# Patient Record
Sex: FEMALE | Race: WHITE | NOT HISPANIC OR LATINO | Employment: PART TIME | URBAN - METROPOLITAN AREA
[De-identification: names, ages, dates, MRNs, and addresses within clinical notes are randomized per-mention and may not be internally consistent; named-entity substitution may affect disease eponyms.]

---

## 2022-07-26 ENCOUNTER — TELEPHONE (OUTPATIENT)
Dept: OTHER | Facility: CLINIC | Age: 42
End: 2022-07-26

## 2022-07-26 NOTE — TELEPHONE ENCOUNTER
Pt needs to be scheduled for new pt in clinic consult with vascular medicine (can be with Umm Enciso PA-C).  Will route to scheduling to coordinate an appointment at next available.   Chart review: Hx of Sjogren's, arthralgias.      Please tell pt to bring progress notes from Dr. Soares along with her or have them faxed to us.     Appt note: new pt self referred for  pronounced pain and numbing in lower extremities. Also has other concerns with pain and tingling in other areas.      Claudia Aguilera, BELLN, RN  [facility] [place]

## 2022-07-26 NOTE — TELEPHONE ENCOUNTER
[facility]    Who is the provider?:  Self referral      Preferred provider location: [place]    Person calling: Sabrina Soler  Call back phone: [phone]       Nurse call back needed: NO          Can we leave a message?  YES        Reason for call:    Patient has pronounced pain and numbing in lower extremities. Also has other concerns with pain and tingling in other areas. Rheumatologist Dr. Soares at [facility] recommended the [facility]    Outside imaging: n/a    Name / Loc of pharmacy: n/a

## 2022-08-01 NOTE — TELEPHONE ENCOUNTER
Left voicemail with instructions for patient to call back to schedule their appointment(s)    August 1, 2022 , 8:28 AM

## 2022-08-03 NOTE — TELEPHONE ENCOUNTER
Future Appointments   Date Time Provider Department Center   8/8/2022  2:50 PM Umm Enciso PA-C [facility]

## 2022-08-08 ENCOUNTER — OFFICE VISIT (OUTPATIENT)
Dept: OTHER | Facility: CLINIC | Age: 42
End: 2022-08-08
Attending: FAMILY MEDICINE
Payer: COMMERCIAL

## 2022-08-08 VITALS
OXYGEN SATURATION: 99 % | DIASTOLIC BLOOD PRESSURE: 63 MMHG | WEIGHT: 143 LBS | BODY MASS INDEX: 21.18 KG/M2 | HEART RATE: 63 BPM | SYSTOLIC BLOOD PRESSURE: 102 MMHG | HEIGHT: 69 IN

## 2022-08-08 DIAGNOSIS — I83.813 VARICOSE VEINS OF BOTH LOWER EXTREMITIES WITH PAIN: Primary | ICD-10-CM

## 2022-08-08 PROCEDURE — 99204 OFFICE O/P NEW MOD 45 MIN: CPT | Performed by: PHYSICIAN ASSISTANT

## 2022-08-08 PROCEDURE — G0463 HOSPITAL OUTPT CLINIC VISIT: HCPCS

## 2022-08-08 RX ORDER — MULTIVITAMIN
1 CAPSULE ORAL
COMMUNITY

## 2022-08-08 RX ORDER — PANTOPRAZOLE SODIUM 40 MG/1
TABLET, DELAYED RELEASE ORAL
COMMUNITY
Start: 2022-05-16

## 2022-08-08 RX ORDER — ASCORBIC ACID 100 MG
TABLET,CHEWABLE ORAL
COMMUNITY

## 2022-08-08 RX ORDER — PREDNISONE 5 MG/1
5 TABLET ORAL
COMMUNITY
Start: 2022-03-28

## 2022-08-08 RX ORDER — CITALOPRAM HYDROBROMIDE 40 MG/1
40 TABLET ORAL
COMMUNITY
Start: 2021-09-18

## 2022-08-08 NOTE — PROGRESS NOTES
[facility] INITIAL VASCULAR MEDICINE CONSULT      PRIMARY HEALTH CARE PROVIDER:  Fernando Duran      REFERRING HEALTH CARE PROVIDER;  Fernando Duran      REASON FOR CONSULT:  Left leg pain, venous insufficicency      HPI: Sabrina Soler is a very pleasant 41 year old female with an extensive past medical history including primary Sjogren's disease which was diagnosed in her 20's. She has been followed by Rheumatology closely over the years and has undergone numerous treatment regimens. She has a long history of varicose veins and venous insufficiency with superficial thrombophlebitis in the left thigh varicose vein in 12/2019. She was seen by Vascular in the past (no records found currently in our system and care everywhere) and apparently they had mentioned surgical intervention. She never pursued this further.     For the past 4-5 months she has had increased left lower extremity discomfort/pain. She has a small amount in the right as well. She has known Raynaud's, which she feels is getting worse, and diffuse polyarthralgias and myalgias. Rheumatology wanted her to be evaluated by Vascular to further look into venous insufficiency as a contributing factor to her lower extremity discomfort as they try to rule in/out cryoglobulin anemic vasculitis secondary to Sjogren's.       PAST MEDICAL HISTORY  Sjogren Syndrome and complications from this disease including SICCA, chronic fatigue and myalgias    CURRENT MEDICATIONS  Ascorbic Acid (VITAMIN C) 100 MG CHEW,   cholecalciferol 50 MCG (2000 UT) CAPS,   citalopram (CELEXA) 40 MG tablet, Take 40 mg by mouth  multivitamin (DEKAS ESSENTIAL) capsule, Take 1 capsule by mouth  Omega-3 Fatty Acids (FISH OIL) 645 MG CAPS, Take 1 capsule by mouth  pantoprazole (PROTONIX) 40 MG EC tablet, TAKE 1 TABLET BY MOUTH EVERY DAY. DO NOT CRUSH. TAKE 30 MINUTES BEFORE BREAKFAST  predniSONE (DELTASONE) 5 MG tablet, Take 5 mg by mouth  Probiotic Product  (PROBIOTIC-10 PO),   CITALOPRAM HYDROBROMIDE 20 MG OR TABS, 1 TABLET DAILY  FOLIC ACID OR, 1 tab daily  HEALON IO, eye drops as needed  METHOTREXATE OR, 3 tabs once per week    No current facility-administered medications on file prior to visit.      PAST SURGICAL HISTORY:  Tonsillectomy and adenoidectomy   Dental implants    ALLERGIES   No Known Allergies    FAMILY HISTORY  PGM has diabetes and she has a brother who had Burkitt's lymphoma.      SOCIAL HISTORY  Social History     Socioeconomic History     Marital status:      Spouse name: Not on file     Number of children: 2     Years of education: Not on file     Highest education level: Not on file   Occupational History     Not on file   Tobacco Use     Smoking status: Never Smoker     Smokeless tobacco: Never Used   Substance and Sexual Activity     Alcohol use: Yes     Comment: occ     Drug use: Never       ROS:   General:   + fatigue.  No fever or chills  Eyes: Negative for vision changes. Eye issues due to SICCA from sjogrens  ENT: No problems with ears, nose or throat.  No difficulty swallowing. Dry mouth with dental implants from sjogren's  Resp: No coughing, wheezing or shortness of breath  CV: No chest pains or palpitations  GI: No nausea, vomiting,  heartburn, diarrhea, constipation or change in bowel habits. + GI issues being followed by GI  : No urinary frequency or dysuria, bladder or kidney problems  Musculoskeletal: Myalgias intermittent from sjogrens, left leg pain.  Neurologic: No headaches, numbness, tingling, weakness, problems with balance or coordination  Psychiatric: No problems with anxiety, depression or mental health  Heme/immune/allergy: No history of bleeding or clotting problems or anemia.  No allergies. + Sjogren's  Endocrine: No history of thyroid disease, diabetes or other endocrine disorders  Skin: No rashes,worrisome lesions or skin problems  Vascular:  No claudication, lifestyle limiting or otherwise; no ischemic rest  "pain; no non-healing ulcers. No weakness, No loss of sensation. Varicose veins as above.     EXAM:  /63 (BP Location: Left arm, Patient Position: Chair, Cuff Size: Adult Regular)   Pulse 63   Ht 5' 8.5\" (1.74 m)   Wt 143 lb (64.9 kg)   SpO2 99%   BMI 21.43 kg/m    In general, the patient is a pleasant female in no apparent distress.    HEENT: NC/AT.  PERRLA.  EOMI.  Sclerae white, not injected.    Heart: RRR. Normal S1, S2 splits physiologically. No murmur, rub, click, or gallop.   Lungs: CTA.  No ronchi, wheezes, rales.    Abdomen: Soft, nontender, nondistended.   Extremities: No clubbing, cyanosis, or edema. She has bulging varicose veins in both legs.  No wounds.       Labs:  LIVER ENZYME RESULTS:  Lab Results   Component Value Date    AST 38 10/22/2008    ALT 20 10/22/2008       CBC RESULTS:  Lab Results   Component Value Date    WBC 5.6 10/22/2008    RBC 3.98 10/22/2008    HGB 11.1 (L) 10/22/2008    HCT 33.8 (L) 10/22/2008    MCV 85 10/22/2008    MCH 27.9 10/22/2008    MCHC 32.8 10/22/2008    RDW 13.3 10/22/2008     10/22/2008       BMP RESULTS:  Lab Results   Component Value Date    CR 0.89 07/09/2008    GFRESTIMATED 76 07/09/2008    GFRESTBLACK >90 07/09/2008          Procedures:   None    Assessment and Plan:   Symptomatic varicose veins left > right lower extremity    -She has had varicose veins in her legs for many years. These could be contributing to her leg pain, heaviness and fatigue.   -It is also possible that her underlying autoimmune disease is also contributing to her leg pain and fatigue.     Recommendations:   -Will send her for venous competency testing to evaluate the severity of her venous insufficiency/varicose veins.   -Follow-up with us after this is completed.   -She should continue to wear compression socks, 20-30 mmHg thigh high in the mean time (she already has these).    Raynaud's in the setting of Sjogren's Syndrome    -Continue thermal protection  -Follows with " Rheumatology    Umm Enciso PA-C      45 minutes spent on the date of the encounter doing chart review, history and exam, documentation, and further activities as noted above.

## 2022-08-08 NOTE — PATIENT INSTRUCTIONS
Wear compression socks during the day.     2.  Get venous competency studies done. We can do a phone follow-up to review results.     3. Call us with any questions or concerns ([phone]).

## 2022-08-08 NOTE — PROGRESS NOTES
"Patient is here to discuss consult.    /63 (BP Location: Left arm, Patient Position: Chair, Cuff Size: Adult Regular)   Pulse 63   Ht 5' 8.5\" (1.74 m)   Wt 143 lb (64.9 kg)   SpO2 99%   BMI 21.43 kg/m      Questions patient would like addressed today are: Left leg is worse for patient especially along the calf. Patient also had an US which showed something other than a clot.    Refills are needed: N/A    Has homecare services and agency name:  [facility]  "

## 2022-08-11 ENCOUNTER — ANCILLARY PROCEDURE (OUTPATIENT)
Dept: ULTRASOUND IMAGING | Facility: CLINIC | Age: 42
End: 2022-08-11
Attending: PHYSICIAN ASSISTANT
Payer: COMMERCIAL

## 2022-08-11 DIAGNOSIS — I83.813 VARICOSE VEINS OF BOTH LOWER EXTREMITIES WITH PAIN: ICD-10-CM

## 2022-08-11 PROCEDURE — 93970 EXTREMITY STUDY: CPT | Performed by: SURGERY

## 2022-08-18 ENCOUNTER — VIRTUAL VISIT (OUTPATIENT)
Dept: OTHER | Facility: CLINIC | Age: 42
End: 2022-08-18
Attending: PHYSICIAN ASSISTANT
Payer: COMMERCIAL

## 2022-08-18 DIAGNOSIS — I87.2 VENOUS INCOMPETENCE: Primary | ICD-10-CM

## 2022-08-18 PROCEDURE — 99213 OFFICE O/P EST LOW 20 MIN: CPT | Mod: 95 | Performed by: PHYSICIAN ASSISTANT

## 2022-08-18 NOTE — PROGRESS NOTES
[facility] CENTER  VASCULAR MEDICINE FOLLOW-UP VISIT      PRIMARY HEALTH CARE PROVIDER:  Fernando Duran    REASON FOR VISIT:  Follow-up venous competency testing      HPI: Sabrina Soler is a very pleasant 41 year old female with an extensive past medical history including primary Sjogren's disease which was diagnosed in her 20's. She has been followed by Rheumatology closely over the years and has undergone numerous treatment regimens. She has a long history of varicose veins and venous insufficiency with superficial thrombophlebitis in the left thigh varicose vein in 12/2019. She was seen by Vascular in the past (no records found currently in our system and care everywhere) and apparently they had mentioned surgical intervention. She never pursued this further. She has been wearing compression socks (thigh high) for years.     For the past 4-5 months she has had increased left lower extremity discomfort/pain. She has a small amount in the right as well. She has known Raynaud's, which she feels is getting worse, and diffuse polyarthralgias and myalgias. Rheumatology wanted her to be evaluated by Vascular to further look into venous insufficiency as a contributing factor to her lower extremity discomfort as they try to rule in/out cryoglobulin anemic vasculitis secondary to Sjogren's.      We ordered venous competency studies to further evaluate the severity of her venous incompetence and varicose veins. This was undertaken on 8/11/22 and she presents now to review the results.       PAST MEDICAL HISTORY  Sjogren Syndrome and complications from this disease including SICCA, chronic fatigue and myalgias    PAST SURGICAL HISTORY:  Tonsillectomy and adenoidectomy   Dental implants    CURRENT MEDICATIONS  Ascorbic Acid (VITAMIN C) 100 MG CHEW,   cholecalciferol 50 MCG (2000 UT) CAPS,   citalopram (CELEXA) 40 MG tablet, Take 40 mg by mouth  CITALOPRAM HYDROBROMIDE 20 MG OR TABS, 1 TABLET DAILY  FOLIC  ACID OR, 1 tab daily  HEALON IO, eye drops as needed  METHOTREXATE OR, 3 tabs once per week  multivitamin (DEKAS ESSENTIAL) capsule, Take 1 capsule by mouth  Omega-3 Fatty Acids (FISH OIL) 645 MG CAPS, Take 1 capsule by mouth  pantoprazole (PROTONIX) 40 MG EC tablet, TAKE 1 TABLET BY MOUTH EVERY DAY. DO NOT CRUSH. TAKE 30 MINUTES BEFORE BREAKFAST  predniSONE (DELTASONE) 5 MG tablet, Take 5 mg by mouth  Probiotic Product (PROBIOTIC-10 PO),     No current facility-administered medications on file prior to visit.      ALLERGIES   No Known Allergies    FAMILY HISTORY  PGM has diabetes and she has a brother who had Burkitt's lymphoma.    SOCIAL HISTORY  Social History     Socioeconomic History     Marital status:      Spouse name: Not on file     Number of children: 2     Years of education: Not on file     Highest education level: Not on file   Occupational History     Not on file   Tobacco Use     Smoking status: Never Smoker     Smokeless tobacco: Never Used   Substance and Sexual Activity     Alcohol use: Yes     Comment: occ     Drug use: Never       ROS:   Complete ROS negative other than what is stated in HPI.     EXAM:  There were no vitals taken for this visit.  In general, the patient is a pleasant female in no apparent distress.    Unable to do a full exam as this was a telephone visit.     Labs:  None    Procedures:   BILATERAL LOWER EXTREMITY VENOUS DUPLEX FOR VENOUS INSUFFICIENCY  TECHNICAL SUMMARY 8/11/22     A duplex ultrasound study using color flow was performed, to evaluate the bilateral lower extremity veins for valvular incompetence with the patient in a steep reversed trendelenberg.      RIGHT:     The deep veins demonstrate phasic flow, compress and respond to augmentations.  There is no DVT.  The common femoral and mid femoral veins are incompetent and free of thrombus. The remaining deep veins are competent and free of thrombus.      The GSV demonstrates phasic flow, compresses and  responds to augmentations from the saphenofemoral junction to the ankle with no evidence of thrombus. The great saphenous vein measures 10.1 mm at the saphenofemoral junction, 7.2 mm at the proximal thigh, and 5.4 mm at the knee. The GSV is incompetent from SFJ to Mid Calf, with the greatest reflux time of 4768 milliseconds.  The GSV gives rise to a varicose branch measuring 6.4 mm off the  Distal Thigh that courses Posteromedial with a reflux time of 4207 milliseconds.      The AASV is competent ( 2.4 mm) draining into the saphenofemoral junction.       The Giacomini vein is competent ( 1.6 mm) communicating with the small saphenous vein at the knee level.      The SSV demonstrates phasic flow, compresses and responds to augmentations from the popliteal space to the ankle.  No thrombus is seen. The saphenopopliteal junction is absent. The SSV is incompetent at the Mid Calf with a reflux time of 7720 milliseconds.  The SSV gives rise to a varicose branch measuring 3.2 mm off the Mid Calf that courses Toward the ankle with a reflux time of 4949 milliseconds.  Proximal mid SSV also connects with GSV tributary varicose vein.     Perforators: there is no evidence of incompetent  veins at any level.      LEFT:     The deep veins demonstrate phasic flow, compress and respond to augmentations.  There is no DVT.  The common femoral and proximal femoral veins is incompetent and free of thrombus. The remaining deep veins are competent and free of thrombus.      The GSV demonstrates phasic flow, compresses and responds to augmentations from the saphenofemoral junction to the ankle with no evidence of thrombus. The great saphenous vein measures 8.5 mm at the saphenofemoral junction, 6.3 mm at the proximal thigh, and 5.5 mm at the knee. The GSV is incompetent from SFJ to Proximal Calf, with the greatest reflux time of 7753 milliseconds.  The GSV gives rise to a varicose branch measuring 8.7 mm off the  Distal  Thigh that courses Medial with a reflux time of 2771 milliseconds.       The AASV is competent ( 3.3 mm) draining into the saphenofemoral junction.       The Giacomini vein is competent ( 1.3 mm) communicating with the small saphenous vein at the knee level.      The SSV demonstrates phasic flow, compresses and responds to augmentations from the popliteal space to the ankle.  No reflux or thrombus is seen. The saphenopopliteal junction is absent.      Perforators: there is no evidence of incompetent  veins at any level.         FINAL SUMMARY:  1.  No evidence of deep vein thrombosis or superficial thrombophlebitis in either lower extremity  2.  Right common femoral and mid femoral vein incompetence  3.  Left common femoral and proximal femoral vein incompetence.  4.  Right great saphenous vein incompetence  5.  Limited right small saphenous vein incompetence  6.  Incompetent vein branch coursing from thigh segment great saphenous vein  7.  Left great saphenous vein incompetence  8.  Incompetent vein branch coursing from left thigh great saphenous vein     The time of incompetence is greater than 500 milliseconds in superficial and  veins and greater than 1000 milliseconds in deep veins.       Assessment and Plan:   Symptomatic varicose veins left > right lower extremity     -She has had varicose veins in her legs for many years. These could be contributing to her leg pain, heaviness and fatigue.   -It is also possible that her underlying autoimmune disease is also contributing to her leg pain and fatigue.   -Venous competency with both deep and superficial venous incompetence.   -She has worn compression socks through the years without improvement in her symptoms.     Recommendations:   -Will send her to Dr. Corrales at [facility] to weigh in on how much her venous incompetence is contributing to her leg symptoms and evaluate if anything can be done for her venous incompetence/varicose  veins.   -She should continue to wear compression socks, 20-30 mmHg thigh high.       Raynaud's in the setting of Sjogren's Syndrome     -Continue thermal protection  -Follows with Rheumatology        Umm Enciso PA-C    Telephone Visit Details    Start Time: 10:20am  End Time: 10:28am    Originating Location (patient location): [place]    Distant Location (provider location): [facility]    This visit is being conducted as a virtual visit due to the emphasis on mitigation of the COVID-19 virus pandemic. The clinician has decided that the risk of an in-office visit outweighs the benefit for this patient.             20 minutes spent on the date of the encounter doing chart review, history and exam, documentation, and further activities as noted above.

## 2022-08-18 NOTE — PROGRESS NOTES
Sabrina is a 41 year old who is being evaluated via a billable telephone visit.      What phone number would you like to be contacted at? [phone]  How would you like to obtain your AVS? Shawna Chen

## 2022-09-08 ENCOUNTER — OFFICE VISIT (OUTPATIENT)
Dept: VASCULAR SURGERY | Facility: CLINIC | Age: 42
End: 2022-09-08
Attending: PHYSICIAN ASSISTANT
Payer: COMMERCIAL

## 2022-09-08 DIAGNOSIS — I87.2 VENOUS INCOMPETENCE: ICD-10-CM

## 2022-09-08 DIAGNOSIS — I83.893 VARICOSE VEINS OF BILATERAL LOWER EXTREMITIES WITH OTHER COMPLICATIONS: Primary | ICD-10-CM

## 2022-09-08 PROCEDURE — 99203 OFFICE O/P NEW LOW 30 MIN: CPT | Performed by: SURGERY

## 2022-09-08 NOTE — PROGRESS NOTES
VEINS[facility]UTIONS CONSULTATION    HPI:    Sabrina Soler is a pleasant 41 year old female referred by Umm Enciso PA-C for evaluation of bilateral lower extremity varicose veins with pain.  Her medical history is somewhat complicated as she has Sjogren's Syndrome with some joint pain in this regard.  She is not sure whether some of her lower extremity symptoms are related to her joint pain or to her varicose veins.  The pain, especially her left lower extremity, has worsened over the last 4 to 5 months.  Her rheumatologist suggested that she be evaluated to see if the venous insufficiency might be contributing to her pain.    She has had varicose veins since her 20s, only to have them enlarge and for her leg pain to increase with time.  She describes pain in her thigh, calves and feet as an ache, tiredness, heaviness, cramping and a numbness as well as some throbbing and pruritus, worse after being on her feet for long periods of time and improving with elevation of her legs.  Her varicose veins have been complicated by 2 episodes of superficial thrombophlebitis.  She has worn compression hose for more than 3 months without significant relief of her symptoms.    Some of her pain is located in the proximal anterior/anterolateral hip area and also in the posterior thigh extending up to her buttock crease.  The pain in the posterior left thigh is worse after she has sat for long periods of time and seems to improve when she gets up and walks.  The pain tends to be worse when lying to sleep, causing her to be restless and sometimes interfere with ability to get to sleep, thereby interfering with actives of daily living.  Pain is also significant in the distal posteromedial right thigh/knee area.    She has no history of deep vein thrombosis, superficial thrombophlebitis or hemorrhage.    Her family history is negative for varicose veins or venous thromboembolism.    PAST MEDICAL HISTORY: Sjogren's syndrome withSICCA,  chronic fatigue and myalgias    PAST SURGICAL HISTORY: Tonsillectomy, dental implants    FAMILY HISTORY: Negative for varicose veins or venous thromboembolism.  Paternal grandmother has diabetes, brother with Burkitt's lymphoma    SOCIAL HISTORY:   Social History     Tobacco Use     Smoking status: Never Smoker     Smokeless tobacco: Never Used   Substance Use Topics     Alcohol use: Yes     Comment: occ       REVIEW OF SYSTEMS: Review Of Systems  Skin: itching  Eyes: Dry eyes  Ears/Nose/Throat: Dizziness  Respiratory: Cough-dry  Cardiovascular: negative  Gastrointestinal: Abdominal pain, diarrhea, constipation, heartburn  Genitourinary: negative  Musculoskeletal: Back pain, neck pain, foot pain, leg pain, weakness  Neurologic: Headaches, weakness  Psychiatric: Depression, anxiety  Hematologic/Lymphatic/Immunologic: Easy bruising, swollen lymph nodes  Endocrine: negative      Vital signs:  There were no vitals taken for this visit.    Current Outpatient Medications   Medication Sig Dispense Refill     Ascorbic Acid (VITAMIN C) 100 MG CHEW        cholecalciferol 50 MCG (2000 UT) CAPS        citalopram (CELEXA) 40 MG tablet Take 40 mg by mouth       multivitamin (DEKAS ESSENTIAL) capsule Take 1 capsule by mouth       Omega-3 Fatty Acids (FISH OIL) 645 MG CAPS Take 1 capsule by mouth       pantoprazole (PROTONIX) 40 MG EC tablet TAKE 1 TABLET BY MOUTH EVERY DAY. DO NOT CRUSH. TAKE 30 MINUTES BEFORE BREAKFAST       predniSONE (DELTASONE) 5 MG tablet Take 5 mg by mouth       Probiotic Product (PROBIOTIC-10 PO)        Allergies: NKDA      PHYSICAL EXAM:  General: Pleasant, NAD.   HEENT: Normocephalic, atraumatic, external ears and nose normal.   Respiratory: Normal respiratory effort.   Cardiovascular: Pulse is regular.   Musculoskeletal: Gait and station normal.  The joints of her fingers and toes without deformity.  There is no cyanosis of her nailbeds.   EXTREMITIES: Right lower extremity: 6-8 mm varicosity about the  distal posterior/medial right thigh.  No significant stasis hyperpigmentation.  Trace edema.  Left lower extremity: 4 to 7 mm varicosities over the left anteromedial thigh extending distally along the left medial leg.  Palpable fascial defect distal medial leg consistent with an enlarged .  1+ edema  PULSES: R/L (3=normal pulse, 0=no palpable pulse) dorsalis pedis: 0/2; posterior tibial: 3/3.      Neurologic: Grossly normal  Psychiatric: Mood, affect, judgment and insight are normal     Venous Duplex Ultrasound:   INDICATION:  Patient is referred for varicose veins with pain.      EXAM TYPE  BILATERAL LOWER EXTREMITY VENOUS DUPLEX FOR VENOUS INSUFFICIENCY  TECHNICAL SUMMARY     A duplex ultrasound study using color flow was performed, to evaluate the bilateral lower extremity veins for valvular incompetence with the patient in a steep reversed trendelenberg.      RIGHT:     The deep veins demonstrate phasic flow, compress and respond to augmentations.  There is no DVT.  The common femoral and mid femoral veins are incompetent and free of thrombus. The remaining deep veins are competent and free of thrombus.      The GSV demonstrates phasic flow, compresses and responds to augmentations from the saphenofemoral junction to the ankle with no evidence of thrombus. The great saphenous vein measures 10.1 mm at the saphenofemoral junction, 7.2 mm at the proximal thigh, and 5.4 mm at the knee. The GSV is incompetent from SFJ to Mid Calf, with the greatest reflux time of 4768 milliseconds.  The GSV gives rise to a varicose branch measuring 6.4 mm off the  Distal Thigh that courses Posteromedial with a reflux time of 4207 milliseconds.      The AASV is competent ( 2.4 mm) draining into the saphenofemoral junction.       The Giacomini vein is competent ( 1.6 mm) communicating with the small saphenous vein at the knee level.      The SSV demonstrates phasic flow, compresses and responds to augmentations from the  popliteal space to the ankle.  No thrombus is seen. The saphenopopliteal junction is absent. The SSV is incompetent at the Mid Calf with a reflux time of 7720 milliseconds.  The SSV gives rise to a varicose branch measuring 3.2 mm off the Mid Calf that courses Toward the ankle with a reflux time of 4949 milliseconds.  Proximal mid SSV also connects with GSV tributary varicose vein.     Perforators: there is no evidence of incompetent  veins at any level.      LEFT:     The deep veins demonstrate phasic flow, compress and respond to augmentations.  There is no DVT.  The common femoral and proximal femoral veins is incompetent and free of thrombus. The remaining deep veins are competent and free of thrombus.      The GSV demonstrates phasic flow, compresses and responds to augmentations from the saphenofemoral junction to the ankle with no evidence of thrombus. The great saphenous vein measures 8.5 mm at the saphenofemoral junction, 6.3 mm at the proximal thigh, and 5.5 mm at the knee. The GSV is incompetent from SFJ to Proximal Calf, with the greatest reflux time of 7753 milliseconds.  The GSV gives rise to a varicose branch measuring 8.7 mm off the  Distal Thigh that courses Medial with a reflux time of 2771 milliseconds.       The AASV is competent ( 3.3 mm) draining into the saphenofemoral junction.       The Giacomini vein is competent ( 1.3 mm) communicating with the small saphenous vein at the knee level.      The SSV demonstrates phasic flow, compresses and responds to augmentations from the popliteal space to the ankle.  No reflux or thrombus is seen. The saphenopopliteal junction is absent.      Perforators: there is no evidence of incompetent  veins at any level.         FINAL SUMMARY:  1.         No evidence of deep vein thrombosis or superficial thrombophlebitis in either lower extremity  2.  Right common femoral and mid femoral vein incompetence  3.  Left common femoral and proximal  femoral vein incompetence.  4.  Right great saphenous vein incompetence  5.  Limited right small saphenous vein incompetence  6.  Incompetent vein branch coursing from thigh segment great saphenous vein  7.  Left great saphenous vein incompetence  8.  Incompetent vein branch coursing from left thigh great saphenous vein     The time of incompetence is greater than 500 milliseconds in superficial and  veins and greater than 1000 milliseconds in deep veins.        ASSESSMENT:  Left greater than right lower extremity pain with varicose veins and complications of recurrent superficial thrombophlebitis with symptoms that periodically interfere with her ability to get to sleep at night.  We discussed the lower extremity vein anatomy, the pathophysiology of venous insufficiency and the option of continued conservative measures with small risk of recurrent superficial thrombophlebitis, bleeding and progression of disease process.    If she chooses treatment, she is a candidate for endovenous ablation of the great saphenous veins with either concomitant phlebectomies or delayed sclerotherapy for treating the branch tributaries.  Details of the procedure including risks of bleeding, infection, nerve injury, scarring, hyperpigmentation, deep vein thrombosis, recanalization of the ablated veins and recurrent varicose veins were discussed.  I was quite clear with the patient that some of her symptoms may not be from venous insufficiency and may not completely resolve with treatment.    I would treat the left lower extremity first to see if she gains relief of the symptoms.  If so, we could plan to treat the right lower extremity.  Details of phlebectomies and sclerotherapy were discussed with benefits and risks of each.  Either would be suitable in her situation.  Both the patient and her mother voiced understanding and their questions were answered.    Finally, patient asked if venous disease could be at all related  to vasculitis.  I explained that vasculitis typically involves arteries and arterioles.    PLAN:  Possible endovenous ablation of the great saphenous veins in the office setting with either concomitant phlebectomies or delayed sclerotherapy.  The patient will think about her options and let us know if and how she would like to proceed.     Sanjiv Corrales MD    Dictated using Dragon voice recognition software which may result in transcription errors        VEIN CLINIC LEG DRAWING:

## 2022-09-08 NOTE — PROGRESS NOTES
September 8, 2022    Vein Procedure Recommendation    Spoke with patient in clinic.    Dr. Corrales has recommended patient to have the following vein procedure(s):     1. Left leg VNUS closure GSV and possibly either phlebectomies 10-20 (medically necessary) or ultrasound guided sclerotherapy at 6 week post op (medically necessary).     2. Right leg VNUS closure GSV and possibly either phlebectomies 10-20 (medically necessary) or ultrasound guided sclerotherapy at 6 week post op (medically necessary).       Patient is recommended to wear Thigh High compression hose following her procedure. Discussed compression hose. Explained to patient if insurance doesn't cover the compression hose there are a couple different options to getting them and to call the clinic to let us know if they need help.    Patient has thigh high compression at home right now however does not know if they are the correct compression grade. She will look when she is home and will let us know if she needs assistance purchasing a new pair. Gave patient compression guru information as well.     Handed patient written procedure instructions to review on her own (see After Visit Summary).      Next steps:    Insurance Submission  Informed patient this process could take up to 14 business days, but once approved, the patient will be contacted by our surgery scheduler to schedule the above procedure. Gave patient our surgery scheduler's information.    Patient is in agreement with all of the above and has no further questions at this time.    Megan Crawford RN  [facility]  Vein Clinic

## 2022-09-08 NOTE — NURSING NOTE
Patient Reported symptoms:    Right leg   Heaviness A good bit of the time   Achiness A good bit of the time   Swelling A little of the time   Throbbing A little of the time   Itching A little of the time   Appearance Very noticeable   Impact on work/activities Symptoms but full able to participate    Left Leg   Heaviness A good bit of the time   Achiness A good bit of the time   Swelling A little of the time   Throbbing A little of the time   Itching A little of the time   Appearance Very noticeable   Impact on work/activities Symptoms but full able to participate

## 2022-09-08 NOTE — PATIENT INSTRUCTIONS
Pre-Procedure Instructions:                           VNUS Closure and Phlebectomies  You are having a Closure(s) - where one or more veins are closed and Phlebectomies - where one or more veins are removed.    Insurance  Precertification and/or referral authorization may be required by your insurance company.  We will call your insurance company to verify benefits for the medically necessary part of your procedure.    Your Current Medications and Allergies  To reduce bruising, please do not take aspirin-type medications (Motrin, Aleve, Ibuprofen, Advil, etc.) for three days before your procedure. You may take Tylenol if you need a pain reliever.  Are you on blood thinner medications? (Plavix, Coumadin, Eliquis, Xarelto) Please discuss this with your surgeon. You may resume taking your blood thinner medication after your procedure.  Are you sensitive to latex or adhesives used for fake fingernails? Please let us know!    Driving Escort and   Please arrange to have a trusted adult (18 years old or older) drive you to and from the clinic.  For your safety, we recommend you have a trusted adult to stay with you until the next morning.    Your Health  If you have a change in your health before the procedure, contact our office immediately.   (For example: cold symptoms, cough, urinary tract infection, fever, flu symptoms).  A pre-procedure physical is not required.    Note  It is sometimes necessary to adjust the procedure schedule due to emergencies. We greatly appreciate your flexibility and understanding in this matter.  Compression hose are needed following this procedure.                   Check List: The Morning of Your Procedure  ___1. Please do not put anything on your leg(s) or shave the day of your procedure.  ___2. You may take your normal medications the day of your procedure.  ___3. It is recommended you eat a light breakfast or lunch the day of your procedure.  ___4. Wear comfortable  loose-fitting clothing and wide-fitting shoes (i.e. tennis shoes, slip-ons).  ___5. Please arrive at our clinic at the specified time given by the nurse.  ___6. You will sign an affirmation of informed consent.  ___7. Bring your pre-procedure sedation medication (lorazepam and clonidine) with you to the clinic. One hour              before your procedure, you will be instructed to take these medications. The lorazepam (Ativan) lowers              anxiety and sedates you; the clonidine makes the lorazepam more effective. Everyone's body processes              these medications differently. Therefore, reactions to these medications vary. Some people stay awake and              some people sleep through the whole procedure. You may not remember everything about the procedure              or the day. You do not want to make any big decisions for the rest of the day.     The Day of Your Procedure:       VNUS Closure and Phlebectomies  In the Exam Room  A nurse will bring you back to an exam room with your family member or friend. This is when your informed consent will be signed, and you will take your pre-procedure medications.  You will be asked to remove everything from the waist down, including undergarments. You will then put on a hospital gown or shorts and blue booties.  Your surgeon will come in to answer any questions and kacey any bulging varicose veins to be removed.  You will be taken to the restroom to empty your bladder before going into the procedure room.    In the Procedure Room  You will be escorted to the procedure room. You will lie on a procedure table covered with a sheet or blanket.  A nurse will put a blood pressure cuff on your arm and a pulse/oxygen monitor on a finger. Your vital signs will be monitored every 15 minutes.  Your gown will be pulled up slightly and the groin exposed for a short period of time. The surgeon's assistant will clean your foot, leg, and groin with an antibacterial  solution. We will get you covered up as quickly as possible!  Sterile towels and blue drapes will be used to cover you and the table. You will be asked to keep your hands under the blue drapes during the procedure.  The lights will be turned down. The table will be tipped so your head is higher than your feet. You may feel like you're going to slide off, but you won't.    The Procedure  The surgeon will visualize your veins with an ultrasound machine. He or she will then numb your skin and access the vein. A catheter is passed up the vein and positioned with ultrasound guidance. The table will then be tipped head down.  Once the catheter is in the correct position, medication will be injected to numb your leg. You will feel some needle sticks and may feel discomfort as the medication goes in. Once this is done, you should not experience significant discomfort. But if you do, please let us know and more numbing medication can be injected. As the catheter sends out heat, the vein closes off and the catheter is withdrawn.  For the phlebectomy part of the procedure, small incisions are made where the bulging varicose veins have been marked on your leg(s) and these veins will be removed using a small alistair hook instrument.                    VNUS closure                  Phlebectomy    Post-Procedure  Once the procedure is done, your leg(s) will be washed with warm water and dried. Your leg(s) will be bandaged with large soft dressings and a large ace bandage wrapped from toes to groin.   You will be offered something to drink and a light snack.  You will rest with your leg(s) elevated for approximately 30 minutes. Your friend or family member may join you.  For your safety, you will be taken to your car in a wheelchair. If you are able to, it is good to keep your leg(s) elevated on the car ride home.    Post-Procedure Instructions:             VNUS Closure and Phlebectomies    Post-Op Day Zero - The Day of Your  Procedure:0  1. Medication for Pain Control and Inflammation Control   - The numbing medication injected during your procedure will last for several hours. The pre-procedure                 tablets may make you very sleepy and you might not remember everything from the procedure or from                 the day. This will usually wear off by the next day.   - Ibuprofen:  If tolerated, take ibuprofen (e.g., Advil) to reduce inflammation whether or not you have                 pain. For three days, take two tablets (200 mg each) with every meal and at bedtime with a snack. If                 your pain is not controlled with ibuprofen, you may take prescription pain medication (such as Norco),                 if prescribed.   - You may resume taking any medications you were taking before your procedure.  2. Activity   - Rest with your leg(s) elevated above your heart. This will prevent swelling and bleeding.                 You do not need to elevate your leg(s) while sleeping at night. You may go upstairs, sit up to                 eat, use the bathroom, and take several five-minute walks. Otherwise, keep your leg(s) elevated.                 Minimize the amount of time you are up on your feet to about 30 minutes at a time.  3. Bandages   - The incision sites will be covered with soft bandages and an ACE wrap. Keep your bandages on and       dry for 48 hours. The ACE should provide  snug  compression but should not cause pain or numbness       in the toes. If you have significant discomfort or your toes become cold or numb, unwrap your ACE and       rewrap with less tension starting at the toes wrapping upward.  4. Incisions   - Bleeding: You may see some incision sites that are oozing through the bandages. This is not unusual       and can be managed with Rest, Ice, Compression and Elevation (RICE). Apply ice and firm pressure       directly to the site that is bleeding and rest with your leg(s) elevated above your  heart for 20-30 minutes.    Post-Op Day One:  1. Medication   - Ibuprofen: Continue the same as the Day of Your Procedure. If your pain is not controlled with       ibuprofen, you may take prescription pain medication (such as Norco), if prescribed.   2. Activity   - We would like you to get up at least six times and walk around for short periods of time, unless it is       causing you pain. You should not be on your feet more than 90 minutes at a time. Elevate your leg       above your heart when you are not walking.  3. Bandages   - Your bandages must be kept on and dry for 48 hours.  4. Driving   - You may resume driving when you can do so safely. Do not drive if you are taking narcotic pain       medication.    Post-Op Day Two:   1. Medication  - Ibuprofen: Continue the same as the Day of Your Procedure.  2.  Activity  - Walk as tolerated. Elevate as much as possible when not walking.  3. Bandages and Compression  - Remove ACE wrap and padding. Shower and put on your compression hose during waking hours only for at least five days.    (Your doctor may instruct you to keep your bandages on until your return appointment; please follow your doctor's instructions.)  4. Incisions  - Your leg(s) will be bruised; there may be swelling, hard knots under the skin and possibly some numbness. These will likely resolve over time.   If you see  hair-like  strings coming out of your incisions, do not pull them (this will only cause pain/discomfort). We will trim them when you come back for your follow-up appointment.  5. Call Us If:   - You see any areas on your leg that are red and angry in appearance.   - You notice any drainage that is milky or cloudy in appearance or has a foul odor.   - You run a temperature of 100.5 or greater.    Post-Op Day Three:  You will have a follow up appointment 2-4 days post-procedure. At this appointment, you will have an ultrasound and we will check your incisions.     __________________________________________________________________________________________    The Two Weeks Following Your Procedure  1.  Skin Care   - Do not use any lotions, creams or powders on your incision(s) for 14 days or until the incisions have               healed.   - Do not soak in a bathtub, hot tub or go swimming for 14 days or until your incisions have healed.  2.  Medications   - You may use ibuprofen or acetaminophen (e.g., Tylenol) as needed for pain or discomfort.  3.  Activity   - Do not lift over 25 pounds. After about two weeks you may resume exercise such as aerobics, running,               tennis or weightlifting. Use your common sense and ease back into your exercise routine slowly.   - You may feel a cord-like tightness along the inside of your leg. Gentle stretching can be helpful.  4. Compression Hose   - Your doctor may instruct you to wear compression for longer than seven days; please    follow your doctor's instructions. As a comfort measure, you may choose to wear compression for    longer than required.  5.  Travel   - Do not fly in an airplane for 14 days after your procedure. If you have a long car trip planned within    two to three weeks following your procedure, stop and walk for a few minutes every two hours.    Periodic ankle pumps during the ride may be helpful.    Six Week Appointment  - At your six-week appointment, you will see your surgeon for an exam and evaluation. This office visit   will be scheduled when you return for post-op day three return appointment.     Return to Work  1.  If you work outside the home, you may return to work in a few days depending on the extent of your        procedure, how you tolerate it, and the type of work you perform.  2.  Paperwork: If your employer requires paperwork or you would like a letter written to your employer, please        let us know. We will complete disability type forms at no charge. Please allow five business days  for forms        to be completed.      [employer] last reviewed this educational content on 11/1/2019 (RFA photo), 12/1/2019 (Phlebectomy photo)    [id number] [employer]. All rights reserved. This information is not intended as a substitute for professional medical care. Always follow your healthcare professional's instructions.

## 2023-08-31 NOTE — LETTER
9/8/2022         RE: Sabrina Soler  [address]  [place] [address]        Dear Colleague,    Thank you for referring your patient, Sabrina Soler, to the [facility] [place]. Please see a copy of my visit note below.      VEINSOLUTIONS CONSULTATION    HPI:    Sabrina Soler is a pleasant 41 year old female referred by Umm Enciso PA-C for evaluation of bilateral lower extremity varicose veins with pain.  Her medical history is somewhat complicated as she has Sjogren's Syndrome with some joint pain in this regard.  She is not sure whether some of her lower extremity symptoms are related to her joint pain or to her varicose veins.  The pain, especially her left lower extremity, has worsened over the last 4 to 5 months.  Her rheumatologist suggested that she be evaluated to see if the venous insufficiency might be contributing to her pain.    She has had varicose veins since her 20s, only to have them enlarge and for her leg pain to increase with time.  She describes pain in her thigh, calves and feet as an ache, tiredness, heaviness, cramping and a numbness as well as some throbbing and pruritus, worse after being on her feet for long periods of time and improving with elevation of her legs.  Her varicose veins have been complicated by 2 episodes of superficial thrombophlebitis.  She has worn compression hose for more than 3 months without significant relief of her symptoms.    Some of her pain is located in the proximal anterior/anterolateral hip area and also in the posterior thigh extending up to her buttock crease.  The pain in the posterior left thigh is worse after she has sat for long periods of time and seems to improve when she gets up and walks.  The pain tends to be worse when lying to sleep, causing her to be restless and sometimes interfere with ability to get to sleep, thereby interfering with actives of daily living.  Pain is also significant in the distal  posteromedial right thigh/knee area.    She has no history of deep vein thrombosis, superficial thrombophlebitis or hemorrhage.    Her family history is negative for varicose veins or venous thromboembolism.    PAST MEDICAL HISTORY: Sjogren's syndrome withSICCA, chronic fatigue and myalgias    PAST SURGICAL HISTORY: Tonsillectomy, dental implants    FAMILY HISTORY: Negative for varicose veins or venous thromboembolism.  Paternal grandmother has diabetes, brother with Burkitt's lymphoma    SOCIAL HISTORY:   Social History     Tobacco Use     Smoking status: Never Smoker     Smokeless tobacco: Never Used   Substance Use Topics     Alcohol use: Yes     Comment: occ       REVIEW OF SYSTEMS: Review Of Systems  Skin: itching  Eyes: Dry eyes  Ears/Nose/Throat: Dizziness  Respiratory: Cough-dry  Cardiovascular: negative  Gastrointestinal: Abdominal pain, diarrhea, constipation, heartburn  Genitourinary: negative  Musculoskeletal: Back pain, neck pain, foot pain, leg pain, weakness  Neurologic: Headaches, weakness  Psychiatric: Depression, anxiety  Hematologic/Lymphatic/Immunologic: Easy bruising, swollen lymph nodes  Endocrine: negative      Vital signs:  There were no vitals taken for this visit.    Current Outpatient Medications   Medication Sig Dispense Refill     Ascorbic Acid (VITAMIN C) 100 MG CHEW        cholecalciferol 50 MCG (2000 UT) CAPS        citalopram (CELEXA) 40 MG tablet Take 40 mg by mouth       multivitamin (DEKAS ESSENTIAL) capsule Take 1 capsule by mouth       Omega-3 Fatty Acids (FISH OIL) 645 MG CAPS Take 1 capsule by mouth       pantoprazole (PROTONIX) 40 MG EC tablet TAKE 1 TABLET BY MOUTH EVERY DAY. DO NOT CRUSH. TAKE 30 MINUTES BEFORE BREAKFAST       predniSONE (DELTASONE) 5 MG tablet Take 5 mg by mouth       Probiotic Product (PROBIOTIC-10 PO)        Allergies: NKDA      PHYSICAL EXAM:  General: Pleasant, NAD.   HEENT: Normocephalic, atraumatic, external ears and nose normal.   Respiratory:  Normal respiratory effort.   Cardiovascular: Pulse is regular.   Musculoskeletal: Gait and station normal.  The joints of her fingers and toes without deformity.  There is no cyanosis of her nailbeds.   EXTREMITIES: Right lower extremity: 6-8 mm varicosity about the distal posterior/medial right thigh.  No significant stasis hyperpigmentation.  Trace edema.  Left lower extremity: 4 to 7 mm varicosities over the left anteromedial thigh extending distally along the left medial leg.  Palpable fascial defect distal medial leg consistent with an enlarged .  1+ edema  PULSES: R/L (3=normal pulse, 0=no palpable pulse) dorsalis pedis: 0/2; posterior tibial: 3/3.      Neurologic: Grossly normal  Psychiatric: Mood, affect, judgment and insight are normal     Venous Duplex Ultrasound:   INDICATION:  Patient is referred for varicose veins with pain.      EXAM TYPE  BILATERAL LOWER EXTREMITY VENOUS DUPLEX FOR VENOUS INSUFFICIENCY  TECHNICAL SUMMARY     A duplex ultrasound study using color flow was performed, to evaluate the bilateral lower extremity veins for valvular incompetence with the patient in a steep reversed trendelenberg.      RIGHT:     The deep veins demonstrate phasic flow, compress and respond to augmentations.  There is no DVT.  The common femoral and mid femoral veins are incompetent and free of thrombus. The remaining deep veins are competent and free of thrombus.      The GSV demonstrates phasic flow, compresses and responds to augmentations from the saphenofemoral junction to the ankle with no evidence of thrombus. The great saphenous vein measures 10.1 mm at the saphenofemoral junction, 7.2 mm at the proximal thigh, and 5.4 mm at the knee. The GSV is incompetent from SFJ to Mid Calf, with the greatest reflux time of 4768 milliseconds.  The GSV gives rise to a varicose branch measuring 6.4 mm off the  Distal Thigh that courses Posteromedial with a reflux time of 4207 milliseconds.      The AASV is  competent ( 2.4 mm) draining into the saphenofemoral junction.       The Giacomini vein is competent ( 1.6 mm) communicating with the small saphenous vein at the knee level.      The SSV demonstrates phasic flow, compresses and responds to augmentations from the popliteal space to the ankle.  No thrombus is seen. The saphenopopliteal junction is absent. The SSV is incompetent at the Mid Calf with a reflux time of 7720 milliseconds.  The SSV gives rise to a varicose branch measuring 3.2 mm off the Mid Calf that courses Toward the ankle with a reflux time of 4949 milliseconds.  Proximal mid SSV also connects with GSV tributary varicose vein.     Perforators: there is no evidence of incompetent  veins at any level.      LEFT:     The deep veins demonstrate phasic flow, compress and respond to augmentations.  There is no DVT.  The common femoral and proximal femoral veins is incompetent and free of thrombus. The remaining deep veins are competent and free of thrombus.      The GSV demonstrates phasic flow, compresses and responds to augmentations from the saphenofemoral junction to the ankle with no evidence of thrombus. The great saphenous vein measures 8.5 mm at the saphenofemoral junction, 6.3 mm at the proximal thigh, and 5.5 mm at the knee. The GSV is incompetent from SFJ to Proximal Calf, with the greatest reflux time of 7753 milliseconds.  The GSV gives rise to a varicose branch measuring 8.7 mm off the  Distal Thigh that courses Medial with a reflux time of 2771 milliseconds.       The AASV is competent ( 3.3 mm) draining into the saphenofemoral junction.       The Giacomini vein is competent ( 1.3 mm) communicating with the small saphenous vein at the knee level.      The SSV demonstrates phasic flow, compresses and responds to augmentations from the popliteal space to the ankle.  No reflux or thrombus is seen. The saphenopopliteal junction is absent.      Perforators: there is no evidence of  incompetent  veins at any level.         FINAL SUMMARY:  1.         No evidence of deep vein thrombosis or superficial thrombophlebitis in either lower extremity  2.  Right common femoral and mid femoral vein incompetence  3.  Left common femoral and proximal femoral vein incompetence.  4.  Right great saphenous vein incompetence  5.  Limited right small saphenous vein incompetence  6.  Incompetent vein branch coursing from thigh segment great saphenous vein  7.  Left great saphenous vein incompetence  8.  Incompetent vein branch coursing from left thigh great saphenous vein     The time of incompetence is greater than 500 milliseconds in superficial and  veins and greater than 1000 milliseconds in deep veins.        ASSESSMENT:  Left greater than right lower extremity pain with varicose veins and complications of recurrent superficial thrombophlebitis with symptoms that periodically interfere with her ability to get to sleep at night.  We discussed the lower extremity vein anatomy, the pathophysiology of venous insufficiency and the option of continued conservative measures with small risk of recurrent superficial thrombophlebitis, bleeding and progression of disease process.    If she chooses treatment, she is a candidate for endovenous ablation of the great saphenous veins with either concomitant phlebectomies or delayed sclerotherapy for treating the branch tributaries.  Details of the procedure including risks of bleeding, infection, nerve injury, scarring, hyperpigmentation, deep vein thrombosis, recanalization of the ablated veins and recurrent varicose veins were discussed.  I was quite clear with the patient that some of her symptoms may not be from venous insufficiency and may not completely resolve with treatment.    I would treat the left lower extremity first to see if she gains relief of the symptoms.  If so, we could plan to treat the right lower extremity.  Details of  phlebectomies and sclerotherapy were discussed with benefits and risks of each.  Either would be suitable in her situation.  Both the patient and her mother voiced understanding and their questions were answered.    Finally, patient asked if venous disease could be at all related to vasculitis.  I explained that vasculitis typically involves arteries and arterioles.    PLAN:  Possible endovenous ablation of the great saphenous veins in the office setting with either concomitant phlebectomies or delayed sclerotherapy.  The patient will think about her options and let us know if and how she would like to proceed.     Sanjiv Corrales MD    Dictated using Dragon voice recognition software which may result in transcription errors        VEIN CLINIC LEG DRAWING:                  September 8, 2022    Vein Procedure Recommendation    Spoke with patient in clinic.    Dr. Corrales has recommended patient to have the following vein procedure(s):     1. Left leg VNUS closure GSV and possibly either phlebectomies 10-20 (medically necessary) or ultrasound guided sclerotherapy at 6 week post op (medically necessary).     2. Right leg VNUS closure GSV and possibly either phlebectomies 10-20 (medically necessary) or ultrasound guided sclerotherapy at 6 week post op (medically necessary).       Patient is recommended to wear Thigh High compression hose following her procedure. Discussed compression hose. Explained to patient if insurance doesn't cover the compression hose there are a couple different options to getting them and to call the clinic to let us know if they need help.    Patient has thigh high compression at home right now however does not know if they are the correct compression grade. She will look when she is home and will let us know if she needs assistance purchasing a new pair. Gave patient compression guru information as well.     Handed patient written procedure instructions to review on her own (see  After Visit Summary).      Next steps:    Insurance Submission  Informed patient this process could take up to 14 business days, but once approved, the patient will be contacted by our surgery scheduler to schedule the above procedure. Gave patient our surgery scheduler's information.    Patient is in agreement with all of the above and has no further questions at this time.    Megan Crawford RN  [facility]  Vein Clinic      Again, thank you for allowing me to participate in the care of your patient.        Sincerely,        Sanjiv Corrales MD     Rotation Flap Text: The surgical defect and surrounding skin were prepped with Betadine. The choice of the repair was performed because extensive undermining was required, to close a large gap created by lesion removal, and to reduce tension to enhance both functional and cosmetic results. The defect edges were debeveled with a #15 scalpel blade.  Given the size and location of the defect, a rotation flap was deemed most appropriate. Using a sterile surgical marker, an appropriate rotation flap was drawn incorporating the defect and placing the expected incisions within the relaxed skin tension lines where possible. The area thus outlined was incised deep to adipose tissue with a #15 scalpel blade. The skin margins were undermined to an appropriate distance in all directions. Following this, the designed flap was advanced and carried over into the primary defect and sutured into place. The subcutaneous tissue and dermis were closed with 4-0 Monocryl. Epidermal closure was achieved with 5-0 Polypropylene (running).  During the repair hemostasis was achieved with Pressure. Petrolatum + pressure dressing were applied.